# Patient Record
Sex: FEMALE | Race: WHITE | NOT HISPANIC OR LATINO | Employment: OTHER | URBAN - METROPOLITAN AREA
[De-identification: names, ages, dates, MRNs, and addresses within clinical notes are randomized per-mention and may not be internally consistent; named-entity substitution may affect disease eponyms.]

---

## 2018-10-26 PROBLEM — C54.1 ENDOMETRIAL CANCER (HCC): Status: ACTIVE | Noted: 2018-10-26

## 2018-10-26 RX ORDER — ONDANSETRON HYDROCHLORIDE 8 MG/1
TABLET, FILM COATED ORAL EVERY 8 HOURS PRN
COMMUNITY

## 2018-10-26 RX ORDER — PROCHLORPERAZINE MALEATE 10 MG
10 TABLET ORAL EVERY 6 HOURS PRN
COMMUNITY

## 2018-10-26 RX ORDER — CHOLECALCIFEROL (VITAMIN D3) 125 MCG
3000 CAPSULE ORAL
COMMUNITY

## 2018-10-26 RX ORDER — DEXAMETHASONE 4 MG/1
4 TABLET ORAL 2 TIMES DAILY WITH MEALS
COMMUNITY
End: 2019-01-09 | Stop reason: SDUPTHER

## 2018-10-26 RX ORDER — DIPHENOXYLATE HYDROCHLORIDE AND ATROPINE SULFATE 2.5; .025 MG/1; MG/1
1 TABLET ORAL 4 TIMES DAILY PRN
COMMUNITY
End: 2018-11-07 | Stop reason: SDUPTHER

## 2018-10-26 RX ORDER — IBUPROFEN 600 MG/1
TABLET ORAL EVERY 6 HOURS PRN
COMMUNITY

## 2018-10-26 RX ORDER — EZETIMIBE 10 MG/1
10 TABLET ORAL DAILY
COMMUNITY

## 2018-10-26 RX ORDER — LORAZEPAM 1 MG/1
0.5 TABLET ORAL EVERY 8 HOURS PRN
COMMUNITY
End: 2018-12-04 | Stop reason: SDUPTHER

## 2018-11-07 ENCOUNTER — OFFICE VISIT (OUTPATIENT)
Dept: GYNECOLOGIC ONCOLOGY | Facility: CLINIC | Age: 83
End: 2018-11-07
Payer: MEDICARE

## 2018-11-07 VITALS
SYSTOLIC BLOOD PRESSURE: 110 MMHG | BODY MASS INDEX: 15.21 KG/M2 | WEIGHT: 77.5 LBS | HEART RATE: 88 BPM | TEMPERATURE: 98.6 F | HEIGHT: 60 IN | DIASTOLIC BLOOD PRESSURE: 70 MMHG | RESPIRATION RATE: 16 BRPM

## 2018-11-07 DIAGNOSIS — R19.7 DIARRHEA, UNSPECIFIED TYPE: Primary | ICD-10-CM

## 2018-11-07 DIAGNOSIS — K52.9 CHRONIC DIARRHEA: ICD-10-CM

## 2018-11-07 DIAGNOSIS — C54.1 ENDOMETRIAL CANCER (HCC): Primary | ICD-10-CM

## 2018-11-07 DIAGNOSIS — R53.82 CHRONIC FATIGUE: ICD-10-CM

## 2018-11-07 PROCEDURE — 99215 OFFICE O/P EST HI 40 MIN: CPT | Performed by: OBSTETRICS & GYNECOLOGY

## 2018-11-07 RX ORDER — DIPHENOXYLATE HYDROCHLORIDE AND ATROPINE SULFATE 2.5; .025 MG/1; MG/1
1 TABLET ORAL 4 TIMES DAILY PRN
Qty: 30 TABLET | Refills: 3 | Status: SHIPPED | OUTPATIENT
Start: 2018-11-07

## 2018-11-07 NOTE — ASSESSMENT & PLAN NOTE
The patient has a long history of chronic fatigue  It is likely significantly due to age and declining over time  It is definitely worse when her disease is active and her treatment is ongoing  A CBC will be drawn to assure that she is not significantly anemic as a heart murmur was noted on exam today  Additionally a thyroid function will be drawn to assure she is not hypothyroid

## 2018-11-07 NOTE — PROGRESS NOTES
Assessment/Plan:    Problem List Items Addressed This Visit     RESOLVED: Endometrial cancer ([facility] [address] ) - Primary     Patient has active recurrence of her endometrial cancer as evidence by PET-CT scan and active bleeding and cramping  The patient has had this ongoing for approximately 15 years  We have discussed treatment options  In the past the patient has been very resistant not treat as it affects her quality of life  We have recommended restarting Taxol at 135 milligrams/meter squared every 3 weeks  We have recommended no Neulasta support  We discussed with the patient risks and benefits of the treatment including transfusion as result of low blood counts infection as a result of low blood counts kidney and liver damage as well as infection  The patient is aware of this as she has been taking the drug off and on for many years now  She has signed an informed consent and would like to get started with her chemo as soon as possible rather than waiting till after the holidays  She understands that this is palliative treatment and the symptoms will likely regress for some time and then recur  Pretreatment labs will be ordered prior to the initiation of treatment  Chronic diarrhea     The patient has a history of chronic diarrhea  This has been ongoing for many years  It is likely the result of treatment including chemo and radiation therapy  She has used Imodium in the past with minimal success  We will write a prescription for Lomotil to be used on a p r n  basis  Chronic fatigue     The patient has a long history of chronic fatigue  It is likely significantly due to age and declining over time  It is definitely worse when her disease is active and her treatment is ongoing  A CBC will be drawn to assure that she is not significantly anemic as a heart murmur was noted on exam today  Additionally a thyroid function will be drawn to assure she is not hypothyroid  CHIEF COMPLAINT:   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 [address] ) (Resolved)    4/2004 Initial Diagnosis     Endometrial cancer ([facility] [address] ) grade 2 versus pap serous         4/22/2004 Surgery     LINO BSO P/PALND for stage IA gr 1 endometrial cancer         5/2004 - 6/2004 Radiation     Vaginal brachytherapy X3         2007 Progression     Upper vagina         2007 -  Radiation     WP XRT plus 2 further vag brachytherapy         4/2009 Progression     Pet positive upper vagina cuff         4/2009 -  Chemotherapy     Started megace but unable to tolerate         6/2009 Surgery     Ex Lap radical parametrectomy partial vaginectomy for recurrent endometrial adenocarcinoma         4/2010 Progression     Biopsy proven upper vaginal recurrence         5/2010 - 11/2010 Chemotherapy     Carbo/taxol X6 No further pain or bleeding         10/2011 Progression     Vaginal bleeding         10/2011 - 3/2012 Chemotherapy     Carbo/taxotere  Couldn't tolerate  Completed with carb/toxol for a total of 6 cycles         11/2012 - 4/2013 Chemotherapy     Carbo/taxol X 6         10/2013 - 3/2014 Chemotherapy     Carbo/taxol X 6         7/2014 - 11/2014 Chemotherapy     Carbo/taxol X6         4/2015 - 8/2015 Chemotherapy     Avastin X 6 with progrssion         6/2015 - 10/2015 Chemotherapy     Taxol X 6         8/2016 - 12/2016 Chemotherapy     Taxol X 6         1/2017 - 8/2017 Chemotherapy     Taxol X 6         1/2018 - 6/2018 Chemotherapy     Taxol X 6              Patient ID: Idalia Mt is a 80 y o  female  Patient is very pleasant 44-year-old white female that I have treated for approximately 15 years  She has a history of 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  She has had too numerous to count recurrences which tend to be at the upper vaginal cuff causing bleeding and pain  She has undergone multiple treatment measures to help to control this including pelvic radiation therapy, vaginal brachytherapy, surgical resection of primary endometrial cancer, surgical resection of the upper vagina for recurrence, and multiple cycles of chemo which she has some difficulty tolerating  Over the past several years we settled on a regimen of Taxol alone without Neulasta or any other support she tolerates this well and this decreases her vaginal bleeding and pelvic pain  Recently the patient be and I have ongoing vaginal bleeding again  She underwent a PET-CT scan in October of 2018  This revealed a FDG avid site in the mid anterior pelvis consistent with a recurrence of her upper vaginal recurrent endometrial cancer  Additionally there were small nodules in the lungs which were borderline FDG avid  It is unclear whether her disease has metastasized  Over the past several months the patient has experience worsening vaginal bleeding  She goes through 3-4 brief so per day  She has associated cramping with this which has not required pain medications  The patient is relatively intolerant of any pain medication and therefore does not take it  She also has had some significant fatigue and spends much time in bed  She is interested in restarting chemotherapy as her symptoms of cramping and bleeding are intolerable  Review of Systems   Constitutional: Positive for fatigue  HENT: Negative  Eyes: Negative  Respiratory: Negative  Cardiovascular: Negative  Gastrointestinal: Positive for abdominal pain and diarrhea  Endocrine: Negative      Genitourinary: Positive for pelvic pain and vaginal bleeding  Musculoskeletal: Negative  Skin: Negative  Neurological: Negative  Hematological: Negative  Psychiatric/Behavioral: Negative  Current Outpatient Prescriptions   Medication Sig Dispense Refill    ASPIRIN 81 PO Take by mouth      CA & PHOS-VIT D 703- MG-MG-UNIT TABS Take by mouth      Cholecalciferol (VITAMIN D3) 88259 units TABS Take by mouth      cyanocobalamin 1000 MCG tablet Take 100 mcg by mouth daily      dexamethasone (DECADRON) 4 mg tablet Take 4 mg by mouth 2 (two) times a day with meals      Dextrose, Diabetic Use, (GLUTOSE 15 PO) Take by mouth      diphenoxylate-atropine (LOMOTIL) 2 5-0 025 mg per tablet Take 1 tablet by mouth 4 (four) times a day as needed for diarrhea      ezetimibe (ZETIA) 10 mg tablet Take 10 mg by mouth daily      ibuprofen (MOTRIN) 600 mg tablet Take by mouth every 6 (six) hours as needed for mild pain      lactase (LACTAID) 3,000 units tablet Take 3,000 Units by mouth 3 (three) times a day with meals      LORazepam (ATIVAN) 1 mg tablet Take 0 5 mg by mouth every 8 (eight) hours as needed for anxiety      ondansetron (ZOFRAN) 8 mg tablet Take by mouth every 8 (eight) hours as needed for nausea or vomiting      prochlorperazine (COMPAZINE) 10 mg tablet Take 10 mg by mouth every 6 (six) hours as needed for nausea or vomiting       No current facility-administered medications for this visit          Allergies   Allergen Reactions    Anastrozole Other (See Comments)     Body aches      Atorvastatin Other (See Comments)     Generalized MSK pain       Azithromycin Nausea Only, GI Intolerance and Vomiting    Carboplatin Itching    Ciprofloxacin Other (See Comments)     Unknown     Hydrocodone      zahraa hydromorphone 6/09    Iodine      IV Dye- hives      Ioxaglate Hives    Latex Hives    Parabens Hives    Penicillins Hives    Rofecoxib      Sick all over      Sulfa Antibiotics      zahraa lasix         Diphenhydramine Anxiety    Sulfacetamide Sodium-Sulfur Rash       Past Medical History:   Diagnosis Date    Abdominal pain     Avascular necrosis of bone of hip, right (HCC)     Cerebrovascular disease     Acute III-defined     Cholecystitis     Chronic kidney disease     Right renal atrophy     Endometrial cancer (HCC)     Hereditary and idiopathic peripheral neuropathy     History of radiation therapy     Vaginal brachytherapy     Hyperlipidemia     Hypertension     Hypoglycemia     Malignant neoplasm of endometrium (HCC)     Radiation colitis     Shingles     Small bowel obstruction (HCC)     Syncope     per pt blacked out due to low blood sugar     TIA (transient ischemic attack)        Past Surgical History:   Procedure Laterality Date    EXPLORATORY LAPAROTOMY W/ BOWEL RESECTION      extensive DAVON, segmental bowel resection     HYSTERECTOMY      W/ B/L salpingoophorectomy     KNEE SURGERY      LYMPH NODE DISSECTION      PARTIAL HIP ARTHROPLASTY      PORTACATH PLACEMENT      TOTAL ABDOMINAL HYSTERECTOMY W/ BILATERAL SALPINGOOPHORECTOMY      PPLND grade 1 endometrial cancer stage 1A    VENTRAL HERNIA REPAIR         OB History      Para Term  AB Living    1 1 1 0 0 1    SAB TAB Ectopic Multiple Live Births    0 0 0 0 1          Family History   Problem Relation Age of Onset    Colon cancer Mother     Lung cancer Sister     Prostate cancer Brother     Endometrial cancer Cousin        The following portions of the patient's history were reviewed and updated as appropriate: allergies, current medications, past family history, past medical history, past social history, past surgical history and problem list       Objective:    Blood pressure 110/70, pulse 88, temperature 98 6 °F (37 °C), resp  rate 16, height 5' (1 524 m), weight 35 2 kg (77 lb 8 oz)  Body mass index is 15 14 kg/m²  Physical Exam   Constitutional: She is oriented to person, place, and time     Cachectic HENT:   Head: Normocephalic and atraumatic  Eyes: EOM are normal    Temporal wasting   Neck: Normal range of motion  Neck supple  No thyromegaly present  Cardiovascular: Normal rate and regular rhythm  Murmur heard  Blowing holosystolic ejection murmur noted   Pulmonary/Chest: Effort normal and breath sounds normal    Abdominal: Soft  Bowel sounds are normal    Well healed laparoscopic incisions  Genitourinary:   Genitourinary Comments: -deferred per patient request   Musculoskeletal: Normal range of motion  Lymphadenopathy:     She has no cervical adenopathy  Neurological: She is alert and oriented to person, place, and time  Skin: Skin is warm and dry  Psychiatric: She has a normal mood and affect   Her behavior is normal

## 2018-11-07 NOTE — ASSESSMENT & PLAN NOTE
Patient has active recurrence of her endometrial cancer as evidence by PET-CT scan and active bleeding and cramping  The patient has had this ongoing for approximately 15 years  We have discussed treatment options  In the past the patient has been very resistant not treat as it affects her quality of life  We have recommended restarting Taxol at 135 milligrams/meter squared every 3 weeks  We have recommended no Neulasta support  We discussed with the patient risks and benefits of the treatment including transfusion as result of low blood counts infection as a result of low blood counts kidney and liver damage as well as infection  The patient is aware of this as she has been taking the drug off and on for many years now  She has signed an informed consent and would like to get started with her chemo as soon as possible rather than waiting till after the holidays  She understands that this is palliative treatment and the symptoms will likely regress for some time and then recur  Pretreatment labs will be ordered prior to the initiation of treatment

## 2018-11-07 NOTE — ASSESSMENT & PLAN NOTE
The patient has a history of chronic diarrhea  This has been ongoing for many years  It is likely the result of treatment including chemo and radiation therapy  She has used Imodium in the past with minimal success  We will write a prescription for Lomotil to be used on a p r n  basis

## 2018-11-28 RX ORDER — SODIUM CHLORIDE 9 MG/ML
20 INJECTION, SOLUTION INTRAVENOUS CONTINUOUS
Status: DISCONTINUED | OUTPATIENT
Start: 2018-11-29 | End: 2018-12-02 | Stop reason: HOSPADM

## 2018-11-29 ENCOUNTER — HOSPITAL ENCOUNTER (OUTPATIENT)
Dept: INFUSION CENTER | Facility: CLINIC | Age: 83
Discharge: HOME | End: 2018-11-29
Payer: MEDICARE

## 2018-11-29 VITALS
RESPIRATION RATE: 18 BRPM | BODY MASS INDEX: 16.84 KG/M2 | SYSTOLIC BLOOD PRESSURE: 104 MMHG | DIASTOLIC BLOOD PRESSURE: 57 MMHG | HEIGHT: 58 IN | TEMPERATURE: 98.1 F | HEART RATE: 87 BPM | WEIGHT: 80.25 LBS

## 2018-11-29 LAB — TSH SERPL DL<=0.05 MIU/L-ACNC: 1.66 UIU/ML (ref 0.36–3.74)

## 2018-11-29 PROCEDURE — 96415 CHEMO IV INFUSION ADDL HR: CPT

## 2018-11-29 PROCEDURE — 84443 ASSAY THYROID STIM HORMONE: CPT | Performed by: OBSTETRICS & GYNECOLOGY

## 2018-11-29 PROCEDURE — 96367 TX/PROPH/DG ADDL SEQ IV INF: CPT

## 2018-11-29 PROCEDURE — 96413 CHEMO IV INFUSION 1 HR: CPT

## 2018-11-29 RX ADMIN — DEXAMETHASONE SODIUM PHOSPHATE 20 MG: 10 INJECTION, SOLUTION INTRAMUSCULAR; INTRAVENOUS at 09:42

## 2018-11-29 RX ADMIN — FAMOTIDINE 20 MG: 10 INJECTION, SOLUTION INTRAVENOUS at 10:48

## 2018-11-29 RX ADMIN — DIPHENHYDRAMINE HYDROCHLORIDE 25 MG: 50 INJECTION, SOLUTION INTRAMUSCULAR; INTRAVENOUS at 10:16

## 2018-11-29 RX ADMIN — HEPARIN 300 UNITS: 100 SYRINGE at 14:25

## 2018-11-29 RX ADMIN — PACLITAXEL 167 MG: 6 INJECTION, SOLUTION INTRAVENOUS at 11:25

## 2018-11-29 RX ADMIN — SODIUM CHLORIDE 20 ML/HR: 0.9 INJECTION, SOLUTION INTRAVENOUS at 09:35

## 2018-11-29 NOTE — PLAN OF CARE
Problem: Potential for Falls  Goal: Patient will remain free of falls  INTERVENTIONS:  - Assess patient frequently for physical needs  -  Identify cognitive and physical deficits and behaviors that affect risk of falls    -  [place] fall precautions as indicated by assessment   - Educate patient/family on patient safety including physical limitations  - Instruct patient to call for assistance with activity based on assessment  - Modify environment to reduce risk of injury  - Consider OT/PT consult to assist with strengthening/mobility   Outcome: Progressing

## 2018-12-04 DIAGNOSIS — C54.1 ENDOMETRIAL CANCER (HCC): Primary | ICD-10-CM

## 2018-12-04 RX ORDER — LORAZEPAM 1 MG/1
1 TABLET ORAL EVERY 6 HOURS PRN
Qty: 30 TABLET | Refills: 1 | Status: SHIPPED | OUTPATIENT
Start: 2018-12-04

## 2018-12-19 ENCOUNTER — OFFICE VISIT (OUTPATIENT)
Dept: GYNECOLOGIC ONCOLOGY | Facility: CLINIC | Age: 83
End: 2018-12-19
Payer: MEDICARE

## 2018-12-19 ENCOUNTER — TELEPHONE (OUTPATIENT)
Dept: PALLIATIVE MEDICINE | Facility: CLINIC | Age: 83
End: 2018-12-19

## 2018-12-19 VITALS
RESPIRATION RATE: 16 BRPM | HEART RATE: 68 BPM | BODY MASS INDEX: 18.16 KG/M2 | DIASTOLIC BLOOD PRESSURE: 72 MMHG | WEIGHT: 86.5 LBS | TEMPERATURE: 96.3 F | SYSTOLIC BLOOD PRESSURE: 108 MMHG | HEIGHT: 58 IN

## 2018-12-19 DIAGNOSIS — C54.1 ENDOMETRIAL CANCER (HCC): Primary | ICD-10-CM

## 2018-12-19 PROCEDURE — 99214 OFFICE O/P EST MOD 30 MIN: CPT | Performed by: OBSTETRICS & GYNECOLOGY

## 2018-12-19 RX ORDER — SODIUM CHLORIDE 9 MG/ML
20 INJECTION, SOLUTION INTRAVENOUS CONTINUOUS
Status: DISCONTINUED | OUTPATIENT
Start: 2018-12-20 | End: 2018-12-23 | Stop reason: HOSPADM

## 2018-12-19 NOTE — ASSESSMENT & PLAN NOTE
Patient presents today in consideration for cycle number 2 of Taxol alone  She did have some vocal changes in which made it difficult to speak  This lasted 1 week  She was able to write during this time to communicate  She did not have any other symptoms of upper respiratory tract infections or other neurologic symptoms  Otherwise she has tolerated her initial chemotherapy without difficulty  She still appears frail but wishes to proceed with chemotherapy as it helps to improve her symptoms of pelvic pain and vaginal bleeding  The patient does have some needs for help at home and appears frail and forgetful  I would place an palliative care consult the for home evaluation to see how the patient is doing in her own setting and to help to control some of her symptoms

## 2018-12-19 NOTE — PROGRESS NOTES
Assessment/Plan:    Problem List Items Addressed This Visit     RESOLVED: Endometrial cancer ([facility] [address] ) - Primary     Patient presents today in consideration for cycle number 2 of Taxol alone  She did have some vocal changes in which made it difficult to speak  This lasted 1 week  She was able to write during this time to communicate  She did not have any other symptoms of upper respiratory tract infections or other neurologic symptoms  Otherwise she has tolerated her initial chemotherapy without difficulty  She still appears frail but wishes to proceed with chemotherapy as it helps to improve her symptoms of pelvic pain and vaginal bleeding  The patient does have some needs for help at home and appears frail and forgetful  I would place an palliative care consult the for home evaluation to see how the patient is doing in her own setting and to help to control some of her symptoms  Relevant Orders    Ambulatory referral to Palliative Care            CHIEF COMPLAINT:   Chemotherapy with Taxol cycle 2 for recurrent papillary serous endometrial cancer      Problem:  Cancer Staging  No matching staging information was found for the patient        Previous therapy:     Endometrial cancer ([facility] [address] ) (Resolved)    4/2004 Initial Diagnosis     [facility] grade 2 versus pap serous         4/22/2004 Surgery     LINO BSO P/PALND for stage IA gr 1 endometrial cancer         5/2004 - 6/2004 Radiation     Vaginal brachytherapy X3         2007 Progression     Upper vagina         2007 -  Radiation     WP XRT plus 2 further vag brachytherapy         4/2009 Progression     Pet positive upper vagina cuff         4/2009 -  Chemotherapy     Started megace but unable to tolerate         6/2009 Surgery     Ex Lap radical parametrectomy partial vaginectomy for recurrent endometrial adenocarcinoma         4/2010 Progression     Biopsy proven upper vaginal recurrence         5/2010 - 11/2010 Chemotherapy     Carbo/taxol X6 No further pain or bleeding         10/2011 Progression     Vaginal bleeding         10/2011 - 3/2012 Chemotherapy     Carbo/taxotere  Couldn't tolerate  Completed with carb/toxol for a total of 6 cycles         11/2012 - 4/2013 Chemotherapy     Carbo/taxol X 6         10/2013 - 3/2014 Chemotherapy     Carbo/taxol X 6         7/2014 - 11/2014 Chemotherapy     Carbo/taxol X6         4/2015 - 8/2015 Chemotherapy     Avastin X 6 with progrssion         6/2015 - 10/2015 Chemotherapy     Taxol X 6         8/2016 - 12/2016 Chemotherapy     Taxol X 6         1/2017 - 8/2017 Chemotherapy     Taxol X 6         1/2018 - 6/2018 Chemotherapy     Taxol X 6              Patient ID: Barber Burgess is a 80 y o  female  Patient presents today for consideration of cycle 2  Of Taxol alone for recurrent papillary serous endometrial cancer  The patient presents today she notes that she has had mild symptoms after her last chemo  She tolerated the chemo fine however her voice became for course for week post chemo  She communicated by writing during that time  This has subsequently resolved without difficulty  She has had no other symptoms  She does wish to continue her chemo  She is becoming concerned about her frailty and need for help at home  The following portions of the patient's history were reviewed and updated as appropriate: allergies, current medications, past family history, past medical history, past social history, past surgical history and problem list     Review of Systems   Constitutional: Negative  HENT: Negative  Eyes: Negative  Respiratory: Negative  Cardiovascular: Negative  Gastrointestinal: Negative  Endocrine: Negative  Genitourinary: Negative  Musculoskeletal: Negative  Skin: Negative  Neurological: Negative  Hematological: Negative  Psychiatric/Behavioral: Negative          Current Outpatient Prescriptions   Medication Sig Dispense Refill    ASPIRIN 81 PO Take by mouth      CA & PHOS-VIT D 777- MG-MG-UNIT TABS Take by mouth      Cholecalciferol (VITAMIN D3) 10050 units TABS Take by mouth      cyanocobalamin 1000 MCG tablet Take 100 mcg by mouth daily      dexamethasone (DECADRON) 4 mg tablet Take 4 mg by mouth 2 (two) times a day with meals      Dextrose, Diabetic Use, (GLUTOSE 15 PO) Take by mouth      diphenoxylate-atropine (LOMOTIL) 2 5-0 025 mg per tablet Take 1 tablet by mouth 4 (four) times a day as needed for diarrhea 30 tablet 3    ezetimibe (ZETIA) 10 mg tablet Take 10 mg by mouth daily      ibuprofen (MOTRIN) 600 mg tablet Take by mouth every 6 (six) hours as needed for mild pain      lactase (LACTAID) 3,000 units tablet Take 3,000 Units by mouth 3 (three) times a day with meals      LORazepam (ATIVAN) 1 mg tablet Take 1 tablet (1 mg total) by mouth every 6 (six) hours as needed (nausea or anxiety) 30 tablet 1    ondansetron (ZOFRAN) 8 mg tablet Take by mouth every 8 (eight) hours as needed for nausea or vomiting      prochlorperazine (COMPAZINE) 10 mg tablet Take 10 mg by mouth every 6 (six) hours as needed for nausea or vomiting       No current facility-administered medications for this visit  Objective:    Blood pressure 108/72, pulse 68, temperature (!) 96 3 °F (35 7 °C), resp  rate 16, height 4' 10 03" (1 474 m), weight 39 2 kg (86 lb 8 oz)  Body mass index is 18 06 kg/m²  Body surface area is 1 28 meters squared  Physical Exam   Constitutional: She is oriented to person, place, and time  She appears well-developed and well-nourished  HENT:   Head: Normocephalic and atraumatic  Eyes: EOM are normal    Neck: Normal range of motion  Neck supple  No thyromegaly present  Cardiovascular: Normal rate, regular rhythm and normal heart sounds  Pulmonary/Chest: Effort normal and breath sounds normal    Abdominal: Soft  Bowel sounds are normal    Well healed laparoscopic incisions     Genitourinary:   Genitourinary Comments: Deferred per patient request     Musculoskeletal: Normal range of motion  Lymphadenopathy:     She has no cervical adenopathy  Neurological: She is alert and oriented to person, place, and time  Skin: Skin is warm and dry  Psychiatric: She has a normal mood and affect   Her behavior is normal        Outside blood work was reviewed for December 10th and is acceptable with a white blood cell count of 3 1

## 2018-12-20 ENCOUNTER — HOSPITAL ENCOUNTER (OUTPATIENT)
Dept: INFUSION CENTER | Facility: CLINIC | Age: 83
Discharge: HOME | End: 2018-12-20
Payer: MEDICARE

## 2018-12-20 ENCOUNTER — TELEPHONE (OUTPATIENT)
Dept: PALLIATIVE MEDICINE | Facility: CLINIC | Age: 83
End: 2018-12-20

## 2018-12-20 VITALS
BODY MASS INDEX: 16.84 KG/M2 | TEMPERATURE: 97.7 F | DIASTOLIC BLOOD PRESSURE: 50 MMHG | SYSTOLIC BLOOD PRESSURE: 92 MMHG | HEIGHT: 58 IN | HEART RATE: 74 BPM | RESPIRATION RATE: 16 BRPM | WEIGHT: 80.25 LBS

## 2018-12-20 DIAGNOSIS — R60.0 EDEMA OF LEFT LOWER EXTREMITY: Primary | ICD-10-CM

## 2018-12-20 LAB
ALBUMIN SERPL BCP-MCNC: 2.4 G/DL (ref 3.5–5)
ALP SERPL-CCNC: 81 U/L (ref 46–116)
ALT SERPL W P-5'-P-CCNC: 13 U/L (ref 12–78)
ANION GAP SERPL CALCULATED.3IONS-SCNC: 9 MMOL/L (ref 4–13)
AST SERPL W P-5'-P-CCNC: 14 U/L (ref 5–45)
BASOPHILS # BLD AUTO: 0.01 THOUSANDS/ΜL (ref 0–0.1)
BASOPHILS NFR BLD AUTO: 0 % (ref 0–1)
BILIRUB SERPL-MCNC: 0.2 MG/DL (ref 0.2–1)
BUN SERPL-MCNC: 18 MG/DL (ref 5–25)
CALCIUM SERPL-MCNC: 8.2 MG/DL (ref 8.3–10.1)
CHLORIDE SERPL-SCNC: 106 MMOL/L (ref 100–108)
CO2 SERPL-SCNC: 23 MMOL/L (ref 21–32)
CREAT SERPL-MCNC: 1.11 MG/DL (ref 0.6–1.3)
EOSINOPHIL # BLD AUTO: 0.02 THOUSAND/ΜL (ref 0–0.61)
EOSINOPHIL NFR BLD AUTO: 0 % (ref 0–6)
ERYTHROCYTE [DISTWIDTH] IN BLOOD BY AUTOMATED COUNT: 13.2 % (ref 11.6–15.1)
GFR SERPL CREATININE-BSD FRML MDRD: 45 ML/MIN/1.73SQ M
GLUCOSE SERPL-MCNC: 90 MG/DL (ref 65–140)
HCT VFR BLD AUTO: 27.5 % (ref 34.8–46.1)
HGB BLD-MCNC: 8.9 G/DL (ref 11.5–15.4)
IMM GRANULOCYTES # BLD AUTO: 0.03 THOUSAND/UL (ref 0–0.2)
IMM GRANULOCYTES NFR BLD AUTO: 1 % (ref 0–2)
LYMPHOCYTES # BLD AUTO: 0.79 THOUSANDS/ΜL (ref 0.6–4.47)
LYMPHOCYTES NFR BLD AUTO: 15 % (ref 14–44)
MAGNESIUM SERPL-MCNC: 1 MG/DL (ref 1.6–2.6)
MCH RBC QN AUTO: 30.1 PG (ref 26.8–34.3)
MCHC RBC AUTO-ENTMCNC: 32.4 G/DL (ref 31.4–37.4)
MCV RBC AUTO: 93 FL (ref 82–98)
MONOCYTES # BLD AUTO: 0.15 THOUSAND/ΜL (ref 0.17–1.22)
MONOCYTES NFR BLD AUTO: 3 % (ref 4–12)
NEUTROPHILS # BLD AUTO: 4.36 THOUSANDS/ΜL (ref 1.85–7.62)
NEUTS SEG NFR BLD AUTO: 81 % (ref 43–75)
NRBC BLD AUTO-RTO: 0 /100 WBCS
PLATELET # BLD AUTO: 243 THOUSANDS/UL (ref 149–390)
PMV BLD AUTO: 9.2 FL (ref 8.9–12.7)
POTASSIUM SERPL-SCNC: 4.3 MMOL/L (ref 3.5–5.3)
PROT SERPL-MCNC: 6.2 G/DL (ref 6.4–8.2)
RBC # BLD AUTO: 2.96 MILLION/UL (ref 3.81–5.12)
SODIUM SERPL-SCNC: 138 MMOL/L (ref 136–145)
WBC # BLD AUTO: 5.36 THOUSAND/UL (ref 4.31–10.16)

## 2018-12-20 PROCEDURE — 96368 THER/DIAG CONCURRENT INF: CPT

## 2018-12-20 PROCEDURE — 83735 ASSAY OF MAGNESIUM: CPT | Performed by: PHYSICIAN ASSISTANT

## 2018-12-20 PROCEDURE — 96415 CHEMO IV INFUSION ADDL HR: CPT

## 2018-12-20 PROCEDURE — 96413 CHEMO IV INFUSION 1 HR: CPT

## 2018-12-20 PROCEDURE — 80053 COMPREHEN METABOLIC PANEL: CPT | Performed by: PHYSICIAN ASSISTANT

## 2018-12-20 PROCEDURE — 96367 TX/PROPH/DG ADDL SEQ IV INF: CPT

## 2018-12-20 PROCEDURE — 85025 COMPLETE CBC W/AUTO DIFF WBC: CPT | Performed by: PHYSICIAN ASSISTANT

## 2018-12-20 RX ORDER — MAGNESIUM SULFATE HEPTAHYDRATE 40 MG/ML
2 INJECTION, SOLUTION INTRAVENOUS ONCE
Status: COMPLETED | OUTPATIENT
Start: 2018-12-20 | End: 2018-12-20

## 2018-12-20 RX ADMIN — MAGNESIUM SULFATE HEPTAHYDRATE 2 G: 40 INJECTION, SOLUTION INTRAVENOUS at 14:01

## 2018-12-20 RX ADMIN — PACLITAXEL 173 MG: 6 INJECTION, SOLUTION INTRAVENOUS at 12:37

## 2018-12-20 RX ADMIN — FAMOTIDINE 20 MG: 10 INJECTION, SOLUTION INTRAVENOUS at 12:05

## 2018-12-20 RX ADMIN — DIPHENHYDRAMINE HYDROCHLORIDE 25 MG: 50 INJECTION, SOLUTION INTRAMUSCULAR; INTRAVENOUS at 11:42

## 2018-12-20 RX ADMIN — HEPARIN 300 UNITS: 100 SYRINGE at 15:40

## 2018-12-20 RX ADMIN — SODIUM CHLORIDE 20 ML/HR: 0.9 INJECTION, SOLUTION INTRAVENOUS at 10:30

## 2018-12-20 RX ADMIN — DEXAMETHASONE SODIUM PHOSPHATE 20 MG: 10 INJECTION, SOLUTION INTRAMUSCULAR; INTRAVENOUS at 11:21

## 2018-12-20 RX ADMIN — MAGNESIUM SULFATE HEPTAHYDRATE 2 G: 40 INJECTION, SOLUTION INTRAVENOUS at 12:06

## 2018-12-20 NOTE — TELEPHONE ENCOUNTER
Received call from Alma at Dr Sahil Brown office  GYN/ONC yesterday 12 19 18 in am  Initial request was for Palliative services and home evaluation  Per OV note pt is frail forgetful   Continuing with chemo to treat symptoms and bleeding  Dr Sahil Brown requesting home visit to assess her situation and needs  This office reviewed pt information and noted pt lives in [facility] which lies outside of our home visit range  Return call made to office with instructions to try [facility]  to see if she could be opened to their services  300pm call from Dr Sahil Bronw office  [facility] also does not have nurses in that area  Again requesting evaluation by Palliative Care and would like pt to be seen in our office  12 20 18   Colleen Martins Call placed to pt home number today  LM on pt voice mail to please call our office to arrange an office visit with Dr Juan Carlos Rodriguez in [facility] office

## 2018-12-20 NOTE — PROGRESS NOTES
Spoke with Shankar Callahan pa-c pt ok for chemotherapy today, pt to be set up for doppler study for LLE opt also to be given 4 GMs of mag concurrently with taxol today

## 2018-12-20 NOTE — PROGRESS NOTES
Pt d/katiuska in stable condition confirmed appointment for doppler study tomorrow with patient and   Pt d/katiuska in stable condition  AVS provided asked patient to please bring medications to compare for accuracy  Also reviewed next appointments and chemo calendar provided by GYN oncology for labwork   Pt and  verbalized understanding of these instructions

## 2018-12-20 NOTE — PLAN OF CARE
Problem: Potential for Falls  Goal: Patient will remain free of falls  INTERVENTIONS:  - Assess patient frequently for physical needs  -  Identify cognitive and physical deficits and behaviors that affect risk of falls    -  [place] fall precautions as indicated by assessment   - Educate patient/family on patient safety including physical limitations  - Instruct patient to call for assistance with activity based on assessment  - Modify environment to reduce risk of injury  - Consider OT/PT consult to assist with strengthening/mobility   Outcome: Progressing

## 2018-12-21 ENCOUNTER — DOCUMENTATION (OUTPATIENT)
Dept: INFUSION CENTER | Facility: CLINIC | Age: 83
End: 2018-12-21

## 2018-12-21 ENCOUNTER — HOSPITAL ENCOUNTER (OUTPATIENT)
Dept: ULTRASOUND IMAGING | Facility: HOSPITAL | Age: 83
Discharge: HOME | End: 2018-12-21
Payer: MEDICARE

## 2018-12-21 DIAGNOSIS — R60.0 EDEMA OF LEFT LOWER EXTREMITY: ICD-10-CM

## 2018-12-21 PROCEDURE — 93970 EXTREMITY STUDY: CPT

## 2018-12-21 PROCEDURE — 93970 EXTREMITY STUDY: CPT | Performed by: SURGERY

## 2018-12-21 NOTE — SOCIAL WORK
MSW met with pt and her  yesterday in the infusion center  They are both [place]  Pt reports that she needs help at home with housekeeping  Her  drives her to appointments, although he says he does not drive in the dark  We discussed a referral to the [employer] and pt was agreeable to that  Pt had a doppler study scheduled today at 3:00, however her infusion will be running later than that  Pt's  wants to have the doppler done at a medical office near their home, however the ordering physician would prefer it done here due to the concern of a blood clot  Pt's  required several explanations as to why this was the case  There was some concern about them being able to drive home as well, because pt's  does not drive in the dark  The doppler was rescheduled for 8:15am on 12/21  Pt's  says he is unsure if they will make that appointment bc pt sleeps late  MSW and RN stressed to him the importance of getting the testing done, and that if pt was unable to make the 8:15 apt that she should go to the ER and have the test done there  Pt's  did verbalize his understanding  There is no emergency contact listed for pt other than her   They have a son who lives in [place] and friends in the area who help them out, but they do not have phone numbers for any of these people with them and cannot recall what the numbers would be  Pt's  felt comfortable driving home if they were able to leave by 4, which they were  [employer] referral will be made if pt is not hospitalized following doppler study today  MSW did discuss [employer] and [employer] services with them as well, they declined to utilize either at this point  MSW will continue to f/u with pt and spouse as needed

## 2018-12-26 ENCOUNTER — TELEPHONE (OUTPATIENT)
Dept: PALLIATIVE MEDICINE | Facility: CLINIC | Age: 83
End: 2018-12-26

## 2018-12-26 NOTE — TELEPHONE ENCOUNTER
12 26 18 fu call to Dr Gadiel Jasso office  Noted MSW intervention re [employer] for home needs  Pt never called back this office to make appointment  Per [facility] clinical staff please reach out to pt in 1 week after hoildays  Dr Gadiel Jasso would still like her evaluated by Palliative services

## 2019-01-03 NOTE — TELEPHONE ENCOUNTER
FU call to Dr Lyudmila Serrano office  No return call from pt to schedule office visit in our [facility] office  Per Tanmay Marie in office , please call pt after the holidays to try and schedule as new pt   Pt has been seen by Hem /Onc MSW  Per [facility] pt will be evaluated in home for needs     This nurse will call pt first full week of jan 2019

## 2019-01-09 ENCOUNTER — OFFICE VISIT (OUTPATIENT)
Dept: GYNECOLOGIC ONCOLOGY | Facility: CLINIC | Age: 84
End: 2019-01-09
Payer: MEDICARE

## 2019-01-09 VITALS
TEMPERATURE: 98.1 F | WEIGHT: 85 LBS | HEIGHT: 58 IN | DIASTOLIC BLOOD PRESSURE: 70 MMHG | HEART RATE: 80 BPM | RESPIRATION RATE: 18 BRPM | BODY MASS INDEX: 17.84 KG/M2 | SYSTOLIC BLOOD PRESSURE: 100 MMHG

## 2019-01-09 DIAGNOSIS — C54.1 MALIGNANT NEOPLASM OF ENDOMETRIUM (HCC): ICD-10-CM

## 2019-01-09 DIAGNOSIS — R53.82 CHRONIC FATIGUE: ICD-10-CM

## 2019-01-09 DIAGNOSIS — C54.1 MALIGNANT NEOPLASM OF ENDOMETRIUM (HCC): Primary | ICD-10-CM

## 2019-01-09 DIAGNOSIS — C54.1 ENDOMETRIAL CANCER (HCC): ICD-10-CM

## 2019-01-09 DIAGNOSIS — B37.49 GENITAL CANDIDIASIS: ICD-10-CM

## 2019-01-09 PROBLEM — R10.2 PELVIC PAIN: Status: ACTIVE | Noted: 2019-01-09

## 2019-01-09 PROCEDURE — 99215 OFFICE O/P EST HI 40 MIN: CPT | Performed by: OBSTETRICS & GYNECOLOGY

## 2019-01-09 RX ORDER — NYSTATIN 100000 [USP'U]/G
POWDER TOPICAL 2 TIMES DAILY
Qty: 15 G | Refills: 2 | Status: SHIPPED | OUTPATIENT
Start: 2019-01-09 | End: 2019-01-12

## 2019-01-09 RX ORDER — DEXAMETHASONE 4 MG/1
TABLET ORAL
Qty: 50 TABLET | Refills: 0 | Status: SHIPPED | OUTPATIENT
Start: 2019-01-09 | End: 2019-01-09 | Stop reason: SDUPTHER

## 2019-01-09 RX ORDER — DEXAMETHASONE 4 MG/1
TABLET ORAL
Qty: 10 TABLET | Refills: 0 | Status: SHIPPED | OUTPATIENT
Start: 2019-01-09

## 2019-01-09 NOTE — PATIENT INSTRUCTIONS
Please have prescription for topical nystatin powder to be placed on the vaginal area twice daily for skin irritation  The prescription for the steroid (Decadron) was [facility] to be used 5 tablets the morning before chemo and 5 tablets the evening before chemo    Please have CT scan performed as soon as possible

## 2019-01-09 NOTE — PROGRESS NOTES
Assessment/Plan:    Problem List Items Addressed This Visit     RESOLVED: Endometrial cancer ([facility] [address] )     Patient has tolerated her Taxol without significant difficulty  She is feeling somewhat fatigued  He she notes worsening pelvic pain that is making it difficult for her to sit and lay down  This is a new finding  She continues to note some vaginal bleeding  This worries me for possible progression of disease however exam is unreliable  We have ordered a CT scan to rule out progression of disease  If this is normal we will continue with Taxol as planned  Chronic fatigue     The patient continues to have worsening fatigue related to her chemotherapy  She is able to perform functions of daily living  We are in the process of having elderly agency come in for the home for evaluations as both she and her  are experiencing fatigue and memory issues  Other Visit Diagnoses     Malignant neoplasm of endometrium ([facility] [address] )    -  Primary    Relevant Medications    dexamethasone (DECADRON) 4 mg tablet    Other Relevant Orders    CT abdomen pelvis w wo contrast    Genital candidiasis        Relevant Medications    nystatin (MYCOSTATIN) powder            CHIEF COMPLAINT:   Assessment for chemotherapy cycle 3  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      Problem:  Cancer Staging  No matching staging information was found for the patient        Previous therapy:     Endometrial cancer ([facility] [address] ) (Resolved)    4/2004 Initial Diagnosis     [facility] grade 2 versus pap serous         4/22/2004 Surgery     LINO BSO P/PALND for stage IA gr 1 endometrial cancer         5/2004 - 6/2004 Radiation     Vaginal brachytherapy X3         2007 Progression     Upper vagina         2007 -  Radiation     WP XRT plus 2 further vag brachytherapy         4/2009 Progression     Pet positive upper vagina cuff         4/2009 -  Chemotherapy     Started megace but unable to tolerate 6/2009 Surgery     Ex Lap radical parametrectomy partial vaginectomy for recurrent endometrial adenocarcinoma         4/2010 Progression     Biopsy proven upper vaginal recurrence         5/2010 - 11/2010 Chemotherapy     Carbo/taxol X6 No further pain or bleeding         10/2011 Progression     Vaginal bleeding         10/2011 - 3/2012 Chemotherapy     Carbo/taxotere  Couldn't tolerate  Completed with carb/toxol for a total of 6 cycles         11/2012 - 4/2013 Chemotherapy     Carbo/taxol X 6         10/2013 - 3/2014 Chemotherapy     Carbo/taxol X 6         7/2014 - 11/2014 Chemotherapy     Carbo/taxol X6         4/2015 - 8/2015 Chemotherapy     Avastin X 6 with progrssion         6/2015 - 10/2015 Chemotherapy     Taxol X 6         8/2016 - 12/2016 Chemotherapy     Taxol X 6         1/2017 - 8/2017 Chemotherapy     Taxol X 6         1/2018 - 6/2018 Chemotherapy     Taxol X 6              Patient ID: Zenaida Giordano is a 80 y o  female  Patient presents for consideration of cycle 3  Of Taxol alone for recurrent pelvic papillary serous endometrial cancer  She appears to be tolerating her chemotherapy well with the exception of fatigue which is a chronic problem for her  In the interim she has developed worsening pelvic pain  She is taking 1/2 of an Advil for this and has difficulty with both sitting and lying down  She continues to have some degree of vaginal dark discharge  She is having no problems with bowel or bladder function  The following portions of the patient's history were reviewed and updated as appropriate: allergies, current medications, past family history, past medical history, past social history, past surgical history and problem list     Review of Systems   Constitutional: Positive for fatigue  HENT: Negative  Eyes: Negative  Respiratory: Negative  Cardiovascular: Negative  Gastrointestinal: Positive for abdominal pain  Endocrine: Negative      Genitourinary: Positive for pelvic pain  Patient continues with intermittent dark vaginal discharge   Musculoskeletal: Negative  Skin: Negative  Neurological: Negative  Hematological: Negative  Psychiatric/Behavioral: Negative  Current Outpatient Prescriptions   Medication Sig Dispense Refill    ASPIRIN 81 PO Take by mouth      CA & PHOS-VIT D 919- MG-MG-UNIT TABS Take by mouth      Cholecalciferol (VITAMIN D3) 36915 units TABS Take by mouth      cyanocobalamin 1000 MCG tablet Take 100 mcg by mouth daily      dexamethasone (DECADRON) 4 mg tablet Take 4 mg by mouth 2 (two) times a day with meals      Dextrose, Diabetic Use, (GLUTOSE 15 PO) Take by mouth      diphenoxylate-atropine (LOMOTIL) 2 5-0 025 mg per tablet Take 1 tablet by mouth 4 (four) times a day as needed for diarrhea 30 tablet 3    ezetimibe (ZETIA) 10 mg tablet Take 10 mg by mouth daily      ibuprofen (MOTRIN) 600 mg tablet Take by mouth every 6 (six) hours as needed for mild pain      lactase (LACTAID) 3,000 units tablet Take 3,000 Units by mouth 3 (three) times a day with meals      LORazepam (ATIVAN) 1 mg tablet Take 1 tablet (1 mg total) by mouth every 6 (six) hours as needed (nausea or anxiety) 30 tablet 1    ondansetron (ZOFRAN) 8 mg tablet Take by mouth every 8 (eight) hours as needed for nausea or vomiting      prochlorperazine (COMPAZINE) 10 mg tablet Take 10 mg by mouth every 6 (six) hours as needed for nausea or vomiting       No current facility-administered medications for this visit  Objective:    Height 4' 10" (1 473 m), weight 38 6 kg (85 lb)  Body mass index is 17 77 kg/m²  Body surface area is 1 27 meters squared  Physical Exam   Constitutional: She is oriented to person, place, and time  Thin and cachectic white female   HENT:   Head: Normocephalic and atraumatic  Eyes: Pupils are equal, round, and reactive to light  EOM are normal    Neck: Normal range of motion  Neck supple  Cardiovascular: Normal rate, regular rhythm and normal heart sounds  Pulmonary/Chest: Effort normal and breath sounds normal  No respiratory distress  Abdominal: Soft  Bowel sounds are normal  She exhibits no distension and no ascites  There is no tenderness  There is no rigidity, no rebound and no guarding  Genitourinary:   Genitourinary Comments: External vulva with erythema consistent with possible candidiasis vagina is obliterated at the introitus and markedly tender  No visible dark discharge or vaginal bleeding is noted  Rectal exam was deferred   Musculoskeletal: Normal range of motion  Lymphadenopathy:     She has no cervical adenopathy  She has no axillary adenopathy  Right: No inguinal and no supraclavicular adenopathy present  Left: No inguinal and no supraclavicular adenopathy present  Neurological: She is alert and oriented to person, place, and time  Skin: Skin is warm and dry  Psychiatric: She has a normal mood and affect   Her behavior is normal        No results found for:   Lab Results   Component Value Date    K 4 3 12/20/2018     12/20/2018    CO2 23 12/20/2018    BUN 18 12/20/2018    CREATININE 1 11 12/20/2018    CALCIUM 8 2 (L) 12/20/2018    AST 14 12/20/2018    ALT 13 12/20/2018    ALKPHOS 81 12/20/2018    EGFR 45 12/20/2018     Lab Results   Component Value Date    WBC 5 36 12/20/2018    HGB 8 9 (L) 12/20/2018    HCT 27 5 (L) 12/20/2018    MCV 93 12/20/2018     12/20/2018     Lab Results   Component Value Date    NEUTROABS 4 36 12/20/2018

## 2019-01-09 NOTE — ASSESSMENT & PLAN NOTE
The patient continues to have worsening fatigue related to her chemotherapy  She is able to perform functions of daily living  We are in the process of having elderly agency come in for the home for evaluations as both she and her  are experiencing fatigue and memory issues

## 2019-01-09 NOTE — ASSESSMENT & PLAN NOTE
Patient has worsening pelvic pain  She has been intolerant of narcotics and nonsteroidal pain medications in the past   She is using 1/2 an Advil tablet on a p r n  basis  She notes continued dark brown discharge  She does not recall that the discharge is getting worse or better but is having memory issues  I am worried that this may be consistent with progression of disease  A CT scan will be ordered    She will maintain the nonsteroidal use as needed

## 2019-01-09 NOTE — ASSESSMENT & PLAN NOTE
Patient has tolerated her Taxol without significant difficulty  She is feeling somewhat fatigued  He she notes worsening pelvic pain that is making it difficult for her to sit and lay down  This is a new finding  She continues to note some vaginal bleeding  This worries me for possible progression of disease however exam is unreliable  We have ordered a CT scan to rule out progression of disease  If this is normal we will continue with Taxol as planned

## 2019-01-10 ENCOUNTER — HOSPITAL ENCOUNTER (OUTPATIENT)
Dept: INFUSION CENTER | Facility: CLINIC | Age: 84
Discharge: HOME | End: 2019-01-10
Payer: MEDICARE

## 2019-01-10 VITALS
HEART RATE: 86 BPM | HEIGHT: 58 IN | WEIGHT: 87.3 LBS | TEMPERATURE: 98.1 F | RESPIRATION RATE: 18 BRPM | DIASTOLIC BLOOD PRESSURE: 62 MMHG | SYSTOLIC BLOOD PRESSURE: 127 MMHG | BODY MASS INDEX: 18.33 KG/M2

## 2019-01-10 PROCEDURE — 96413 CHEMO IV INFUSION 1 HR: CPT

## 2019-01-10 PROCEDURE — 96368 THER/DIAG CONCURRENT INF: CPT

## 2019-01-10 PROCEDURE — 96367 TX/PROPH/DG ADDL SEQ IV INF: CPT

## 2019-01-10 PROCEDURE — 96415 CHEMO IV INFUSION ADDL HR: CPT

## 2019-01-10 RX ORDER — SODIUM CHLORIDE 9 MG/ML
20 INJECTION, SOLUTION INTRAVENOUS CONTINUOUS
Status: DISCONTINUED | OUTPATIENT
Start: 2019-01-11 | End: 2019-01-10

## 2019-01-10 RX ORDER — SODIUM CHLORIDE 9 MG/ML
20 INJECTION, SOLUTION INTRAVENOUS CONTINUOUS
Status: DISCONTINUED | OUTPATIENT
Start: 2019-01-10 | End: 2019-01-13 | Stop reason: HOSPADM

## 2019-01-10 RX ORDER — MAGNESIUM SULFATE HEPTAHYDRATE 40 MG/ML
2 INJECTION, SOLUTION INTRAVENOUS ONCE
Status: COMPLETED | OUTPATIENT
Start: 2019-01-10 | End: 2019-01-10

## 2019-01-10 RX ADMIN — DEXAMETHASONE SODIUM PHOSPHATE 20 MG: 10 INJECTION, SOLUTION INTRAMUSCULAR; INTRAVENOUS at 10:34

## 2019-01-10 RX ADMIN — DIPHENHYDRAMINE HYDROCHLORIDE 25 MG: 50 INJECTION, SOLUTION INTRAMUSCULAR; INTRAVENOUS at 10:53

## 2019-01-10 RX ADMIN — PACLITAXEL 171 MG: 6 INJECTION, SOLUTION INTRAVENOUS at 11:51

## 2019-01-10 RX ADMIN — MAGNESIUM SULFATE HEPTAHYDRATE 2 G: 40 INJECTION, SOLUTION INTRAVENOUS at 11:47

## 2019-01-10 RX ADMIN — SODIUM CHLORIDE 20 ML/HR: 0.9 INJECTION, SOLUTION INTRAVENOUS at 10:19

## 2019-01-10 RX ADMIN — FAMOTIDINE 20 MG: 10 INJECTION, SOLUTION INTRAVENOUS at 11:14

## 2019-01-10 NOTE — PROGRESS NOTES
Pt tolerated chemotherapy infusion without complications  Pt discharged in stable condition and is aware of her next appointment  AVS provided

## 2019-01-10 NOTE — PROGRESS NOTES
Dr Fabien Stallworth note unclear if we are continuing with Taxol treatments prior to her next CT scan  Per Rossi Aaron PA-C who confirmed verbally with Dr Mora Tubbs, pt is to continue with taxol treatment today prior to pt having CT scan performed  Per Tommie Mcdermott, pt is supposed to have the scan performed at a facility close to their home, and Dr Fabien Stallworth office is working to schedule that for the pt

## 2019-01-10 NOTE — PLAN OF CARE
Problem: Potential for Falls  Goal: Patient will remain free of falls  INTERVENTIONS:  - Assess patient frequently for physical needs  -  Identify cognitive and physical deficits and behaviors that affect risk of falls    -  [place] fall precautions as indicated by assessment   - Educate patient/family on patient safety including physical limitations  - Instruct patient to call for assistance with activity based on assessment  - Modify environment to reduce risk of injury  - Consider OT/PT consult to assist with strengthening/mobility   Outcome: Progressing

## 2019-01-10 NOTE — SOCIAL WORK
Met with pt, her  and friend Cee Lucia today in the infusion center  Pt reports that she is doing well  She confirms that [employer] did come out to the house and assess them for services, however due to her income/assets she does not qualify for free services  She stated "He served, I worked, we did without so that we had money in the bank  No one is taking that from me  If we have to pay - we're not doing it "  She speaks of someone she knows who can coordinate volunteer services for her at home, however she can't give me any further information on that  MSW did s/w pt and her  about [facility] placement   is agreeable, but pt will not consider it, saying "I want to stay in my home  If we can manage at home, why would we leave?"  I suggested that it's not just about managing at home, but not having to worry about cleaning, cooking, running errands, plowing snow, etc   Pt asked "well what do you think we should do?"  I answered back that she did work hard her life to have money in savings, her  served our country and worked hard as well for the money that they have, and if it were me I would want to make sure I was well taken care of in my last years  She agreed to at least consider it, but friend Cee Lucia says that she's been discussing this with them as well and pt has been adamant that she won't leave her home  Hermelinda's  uses the [place] for healthcare, as does pt's , and they try to make their appointments at the same time so they can ride together  They plan on talking to a [facility]  about any assistance pt and her  are eligible for  Pt's son lives in [place], it is unclear if he realizes how his parents have declined and they do not have contact information to give me for him  Pt consents to having Cee Lucia listed as an emergency contact for her, and Cee Lucia is agreeable as well  Otherwise, pt has no emergency contact beyond her     Hermelinda's and her  [employer] numbers were added to the chart by this writer  Kevin Oconnor is willing to help pt out at home as long as they are willing to accept it  MSETHEL suggested that Kevin Oconnor touch base with pt's son so that he is familiar and in agreement with this arrangement, she agreed that she would do that in the next week  No further needs or concerns today, MSW will remain available to pt as needed  MSW provided my contact information to Kevin Oconnor today as well

## 2019-01-12 ENCOUNTER — APPOINTMENT (EMERGENCY)
Dept: CT IMAGING | Facility: HOSPITAL | Age: 84
End: 2019-01-12
Payer: MEDICARE

## 2019-01-12 ENCOUNTER — HOSPITAL ENCOUNTER (EMERGENCY)
Facility: HOSPITAL | Age: 84
Discharge: HOME | End: 2019-01-12
Attending: EMERGENCY MEDICINE
Payer: MEDICARE

## 2019-01-12 VITALS
OXYGEN SATURATION: 100 % | TEMPERATURE: 98 F | HEIGHT: 60 IN | BODY MASS INDEX: 17.92 KG/M2 | RESPIRATION RATE: 16 BRPM | WEIGHT: 91.27 LBS | DIASTOLIC BLOOD PRESSURE: 67 MMHG | HEART RATE: 87 BPM | SYSTOLIC BLOOD PRESSURE: 146 MMHG

## 2019-01-12 DIAGNOSIS — C52 VAGINAL CANCER (HCC): ICD-10-CM

## 2019-01-12 DIAGNOSIS — R21 PERINEAL RASH IN FEMALE: ICD-10-CM

## 2019-01-12 DIAGNOSIS — B37.49 GENITAL CANDIDIASIS: ICD-10-CM

## 2019-01-12 DIAGNOSIS — L89.90 PRESSURE ULCER: ICD-10-CM

## 2019-01-12 DIAGNOSIS — R10.2 PELVIC PAIN IN FEMALE: Primary | ICD-10-CM

## 2019-01-12 LAB
ANION GAP SERPL CALCULATED.3IONS-SCNC: 11 MMOL/L (ref 4–13)
BASOPHILS # BLD AUTO: 0.01 THOUSANDS/ΜL (ref 0–0.1)
BASOPHILS NFR BLD AUTO: 0 % (ref 0–1)
BUN SERPL-MCNC: 28 MG/DL (ref 5–25)
CALCIUM SERPL-MCNC: 8.1 MG/DL (ref 8.3–10.1)
CHLORIDE SERPL-SCNC: 108 MMOL/L (ref 100–108)
CO2 SERPL-SCNC: 21 MMOL/L (ref 21–32)
CREAT SERPL-MCNC: 1.14 MG/DL (ref 0.6–1.3)
EOSINOPHIL # BLD AUTO: 0 THOUSAND/ΜL (ref 0–0.61)
EOSINOPHIL NFR BLD AUTO: 0 % (ref 0–6)
ERYTHROCYTE [DISTWIDTH] IN BLOOD BY AUTOMATED COUNT: 13.5 % (ref 11.6–15.1)
GFR SERPL CREATININE-BSD FRML MDRD: 44 ML/MIN/1.73SQ M
GLUCOSE SERPL-MCNC: 78 MG/DL (ref 65–140)
HCT VFR BLD AUTO: 25.3 % (ref 34.8–46.1)
HGB BLD-MCNC: 8.1 G/DL (ref 11.5–15.4)
IMM GRANULOCYTES # BLD AUTO: 0.06 THOUSAND/UL (ref 0–0.2)
IMM GRANULOCYTES NFR BLD AUTO: 1 % (ref 0–2)
LYMPHOCYTES # BLD AUTO: 1.38 THOUSANDS/ΜL (ref 0.6–4.47)
LYMPHOCYTES NFR BLD AUTO: 14 % (ref 14–44)
MCH RBC QN AUTO: 28.5 PG (ref 26.8–34.3)
MCHC RBC AUTO-ENTMCNC: 32 G/DL (ref 31.4–37.4)
MCV RBC AUTO: 89 FL (ref 82–98)
MONOCYTES # BLD AUTO: 0.2 THOUSAND/ΜL (ref 0.17–1.22)
MONOCYTES NFR BLD AUTO: 2 % (ref 4–12)
NEUTROPHILS # BLD AUTO: 8.38 THOUSANDS/ΜL (ref 1.85–7.62)
NEUTS SEG NFR BLD AUTO: 83 % (ref 43–75)
NRBC BLD AUTO-RTO: 0 /100 WBCS
PLATELET # BLD AUTO: 226 THOUSANDS/UL (ref 149–390)
PMV BLD AUTO: 9.6 FL (ref 8.9–12.7)
POTASSIUM SERPL-SCNC: 4.5 MMOL/L (ref 3.5–5.3)
RBC # BLD AUTO: 2.84 MILLION/UL (ref 3.81–5.12)
SODIUM SERPL-SCNC: 140 MMOL/L (ref 136–145)
WBC # BLD AUTO: 10.03 THOUSAND/UL (ref 4.31–10.16)

## 2019-01-12 PROCEDURE — 96374 THER/PROPH/DIAG INJ IV PUSH: CPT

## 2019-01-12 PROCEDURE — 99284 EMERGENCY DEPT VISIT MOD MDM: CPT

## 2019-01-12 PROCEDURE — 74177 CT ABD & PELVIS W/CONTRAST: CPT

## 2019-01-12 PROCEDURE — 85025 COMPLETE CBC W/AUTO DIFF WBC: CPT | Performed by: EMERGENCY MEDICINE

## 2019-01-12 PROCEDURE — 80048 BASIC METABOLIC PNL TOTAL CA: CPT | Performed by: EMERGENCY MEDICINE

## 2019-01-12 PROCEDURE — 36415 COLL VENOUS BLD VENIPUNCTURE: CPT | Performed by: EMERGENCY MEDICINE

## 2019-01-12 RX ORDER — FENTANYL CITRATE 50 UG/ML
25 INJECTION, SOLUTION INTRAMUSCULAR; INTRAVENOUS ONCE
Status: COMPLETED | OUTPATIENT
Start: 2019-01-12 | End: 2019-01-12

## 2019-01-12 RX ORDER — FLUCONAZOLE 150 MG/1
150 TABLET ORAL DAILY
Status: DISCONTINUED | OUTPATIENT
Start: 2019-01-13 | End: 2019-01-12

## 2019-01-12 RX ORDER — NYSTATIN 100000 [USP'U]/G
POWDER TOPICAL 2 TIMES DAILY
Qty: 15 G | Refills: 0 | Status: SHIPPED | OUTPATIENT
Start: 2019-01-12

## 2019-01-12 RX ORDER — FLUCONAZOLE 150 MG/1
150 TABLET ORAL ONCE
Status: COMPLETED | OUTPATIENT
Start: 2019-01-12 | End: 2019-01-12

## 2019-01-12 RX ADMIN — IODIXANOL 75 ML: 320 INJECTION, SOLUTION INTRAVASCULAR at 16:15

## 2019-01-12 RX ADMIN — HEPARIN 300 UNITS: 100 SYRINGE at 17:18

## 2019-01-12 RX ADMIN — FLUCONAZOLE 150 MG: 150 TABLET ORAL at 17:22

## 2019-01-12 RX ADMIN — FENTANYL CITRATE 25 MCG: 50 INJECTION, SOLUTION INTRAMUSCULAR; INTRAVENOUS at 13:50

## 2019-01-12 NOTE — ED PROVIDER NOTES
History  Chief Complaint   Patient presents with    Vaginal Pain     vaginal pain x multiple weeks  pt was seen by her doctor and told she needs a CT   Rectal Pain     HPI    Prior to Admission Medications   Prescriptions Last Dose Informant Patient Reported? Taking?    ASPIRIN 81 PO  Self Yes No   Sig: Take by mouth   CA & PHOS-VIT D 105- MG-MG-UNIT TABS  Self Yes No   Sig: Take by mouth   Cholecalciferol (VITAMIN D3) 54533 units TABS  Self Yes No   Sig: Take by mouth   Dextrose, Diabetic Use, (GLUTOSE 15 PO)  Self Yes No   Sig: Take by mouth   LORazepam (ATIVAN) 1 mg tablet  Self No No   Sig: Take 1 tablet (1 mg total) by mouth every 6 (six) hours as needed (nausea or anxiety)   cyanocobalamin 1000 MCG tablet  Self Yes No   Sig: Take 100 mcg by mouth daily   dexamethasone (DECADRON) 4 mg tablet   No No   Sig: For 5 tablets by mouth the morning  and the evening prior to chemotherapy   diphenoxylate-atropine (LOMOTIL) 2 5-0 025 mg per tablet  Self No No   Sig: Take 1 tablet by mouth 4 (four) times a day as needed for diarrhea   ezetimibe (ZETIA) 10 mg tablet  Self Yes No   Sig: Take 10 mg by mouth daily   ibuprofen (MOTRIN) 600 mg tablet  Self Yes No   Sig: Take by mouth every 6 (six) hours as needed for mild pain   lactase (LACTAID) 3,000 units tablet  Self Yes No   Sig: Take 3,000 Units by mouth 3 (three) times a day with meals   nystatin (MYCOSTATIN) powder   No No   Sig: Apply topically 2 (two) times a day As needed for vaginal irritation   ondansetron (ZOFRAN) 8 mg tablet  Self Yes No   Sig: Take by mouth every 8 (eight) hours as needed for nausea or vomiting   prochlorperazine (COMPAZINE) 10 mg tablet  Self Yes No   Sig: Take 10 mg by mouth every 6 (six) hours as needed for nausea or vomiting      Facility-Administered Medications: None       Past Medical History:   Diagnosis Date    Abdominal pain     Avascular necrosis of bone of hip, right (HCC)     Cerebrovascular disease     Acute III-defined  Cholecystitis     Chronic kidney disease     Right renal atrophy     Endometrial cancer ([facility] [address] )     Hereditary and idiopathic peripheral neuropathy     History of radiation therapy     Vaginal brachytherapy     Hyperlipidemia     Hypertension     Hypoglycemia     Malignant neoplasm of endometrium (HCC)     Radiation colitis     Shingles     Small bowel obstruction (HCC)     Syncope     per pt blacked out due to low blood sugar     TIA (transient ischemic attack)        Past Surgical History:   Procedure Laterality Date    EXPLORATORY LAPAROTOMY W/ BOWEL RESECTION      extensive DAVON, segmental bowel resection     HYSTERECTOMY      W/ B/L salpingoophorectomy     KNEE SURGERY      LYMPH NODE DISSECTION      PARTIAL HIP ARTHROPLASTY      PORTACATH PLACEMENT      TOTAL ABDOMINAL HYSTERECTOMY W/ BILATERAL SALPINGOOPHORECTOMY      PPLND grade 1 endometrial cancer stage 1A    VENTRAL HERNIA REPAIR         Family History   Problem Relation Age of Onset    Colon cancer Mother     Lung cancer Sister     Prostate cancer Brother     Endometrial cancer Cousin      I have reviewed and agree with the history as documented  Social History   Substance Use Topics    Smoking status: Former Smoker     Years: 2 50     Quit date: 1981    Smokeless tobacco: Never Used    Alcohol use No        Review of Systems    Physical Exam  Physical Exam   Constitutional: She is oriented to person, place, and time  She appears cachectic  She appears ill (chronically)  No distress  frail   HENT:   Head: Normocephalic and atraumatic  Mouth/Throat: Oropharynx is clear and moist    Eyes: Pupils are equal, round, and reactive to light  Conjunctivae are normal    Neck: Normal range of motion  No tracheal deviation present  Cardiovascular: Normal rate, regular rhythm, normal heart sounds and intact distal pulses  Pulmonary/Chest: Effort normal and breath sounds normal  No respiratory distress  Abdominal: Soft  Bowel sounds are normal  She exhibits no distension  There is no tenderness  Genitourinary: Rectal exam shows external hemorrhoid (Several, small, nontender, no blood)  There is rash and tenderness on the right labia  There is no lesion on the right labia  There is rash and tenderness on the left labia  There is no lesion on the left labia  Neurological: She is alert and oriented to person, place, and time  She has normal strength  GCS eye subscore is 4  GCS verbal subscore is 5  GCS motor subscore is 6  Skin: Skin is warm and dry  Psychiatric: She has a normal mood and affect  Her behavior is normal    Nursing note and vitals reviewed        Vital Signs  ED Triage Vitals   Temperature Pulse Respirations Blood Pressure SpO2   01/12/19 1212 01/12/19 1212 01/12/19 1212 01/12/19 1212 01/12/19 1212   98 °F (36 7 °C) 82 16 (!) 171/72 100 %      Temp Source Heart Rate Source Patient Position - Orthostatic VS BP Location FiO2 (%)   01/12/19 1212 01/12/19 1212 01/12/19 1212 01/12/19 1419 --   Oral Monitor Lying Left arm       Pain Score       01/12/19 1212       Worst Possible Pain           Vitals:    01/12/19 1419 01/12/19 1600 01/12/19 1630 01/12/19 1700   BP: 152/71 148/67 140/64 146/67   Pulse: 76 83 83 87   Patient Position - Orthostatic VS: Lying Lying Lying Lying       Visual Acuity      ED Medications  Medications   fentanyl citrate (PF) 100 MCG/2ML 25 mcg (25 mcg Intravenous Given 1/12/19 1350)   iodixanol (VISIPAQUE) 320 MG/ML injection 75 mL (75 mL Intravenous Given 1/12/19 1615)   heparin lock flush 100 units/mL injection 300 Units (300 Units Intracatheter Given 1/12/19 1718)   fluconazole (DIFLUCAN) tablet 150 mg (150 mg Oral Given 1/12/19 1722)       Diagnostic Studies  Results Reviewed     Procedure Component Value Units Date/Time    Basic metabolic panel [[id number]]  (Abnormal) Collected:  01/12/19 1349    Lab Status:  Final result Specimen:  Blood from Arm, Right Updated:  01/12/19 2990 Sodium 140 mmol/L      Potassium 4 5 mmol/L      Chloride 108 mmol/L      CO2 21 mmol/L      ANION GAP 11 mmol/L      BUN 28 (H) mg/dL      Creatinine 1 14 mg/dL      Glucose 78 mg/dL      Calcium 8 1 (L) mg/dL      eGFR 44 ml/min/1 73sq m     Narrative:         National Kidney Disease Education Program recommendations are as follows:  GFR calculation is accurate only with a steady state creatinine  Chronic Kidney disease less than 60 ml/min/1 73 sq  meters  Kidney failure less than 15 ml/min/1 73 sq  meters  CBC and differential [[id number]]  (Abnormal) Collected:  01/12/19 1349    Lab Status:  Final result Specimen:  Blood from Arm, Right Updated:  01/12/19 1354     WBC 10 03 Thousand/uL      RBC 2 84 (L) Million/uL      Hemoglobin 8 1 (L) g/dL      Hematocrit 25 3 (L) %      MCV 89 fL      MCH 28 5 pg      MCHC 32 0 g/dL      RDW 13 5 %      MPV 9 6 fL      Platelets 378 Thousands/uL      nRBC 0 /100 WBCs      Neutrophils Relative 83 (H) %      Immat GRANS % 1 %      Lymphocytes Relative 14 %      Monocytes Relative 2 (L) %      Eosinophils Relative 0 %      Basophils Relative 0 %      Neutrophils Absolute 8 38 (H) Thousands/µL      Immature Grans Absolute 0 06 Thousand/uL      Lymphocytes Absolute 1 38 Thousands/µL      Monocytes Absolute 0 20 Thousand/µL      Eosinophils Absolute 0 00 Thousand/µL      Basophils Absolute 0 01 Thousands/µL                  CT abdomen pelvis with contrast   Final Result by Joe Carroll MD (01/12 9989)      Fluid-filled colonic loops and distal small bowel with suggesting an enteritis/diarrhea illness  The vagina is filled with fluid and demonstrates an enhancing wall perhaps in keeping with provided history of vaginal cancer  Infection could appear similar  Pelvic exam correlation recommended, if feasible  Gallstones without evidence of cholecystitis        Small cystic lesion in the pancreatic head measuring 7 mm with connection to the pancreatic duct in keeping with a side branch IPMN  Follow-up MRI/MRCP could be obtained  Considerations related to the patient's age and/or comorbidities may be used to    alter these recommendations  Workstation performed: [id number]                    Procedures  Procedures       Phone Contacts  ED Phone Contact    ED Course                               MDM  Number of Diagnoses or Management Options  Pelvic pain in female: new and requires workup  Perineal rash in female: new and requires workup  Pressure ulcer: new and does not require workup  Vaginal cancer [facility]):   Diagnosis management comments: This is an 80-year-old female who is being treated for endometrial cancer with recurrence in her vagina is here today with pelvic and vaginal pain  She has had pain in this area for weeks and says that has been gradually getting worse  She had been seen by her gynecologic oncologist earlier this week and had a CT scan ordered  She says she is having significant troubles sitting because of the severity of her pain  She has been having occasional dark vaginal bleeding "for a while" which is unchanged  She endorses slightly more leakage of urine than normal but denies dysuria or other urinary symptoms  She states more recently she has been having pain around her rectum but denies any pain with bowel movements, changes in her bowels, rectal bleeding  She was prescribed a cream by her doctor when she was seen earlier this week that she says she has not yet gotten  She says he did not give her a prescription for it, though he told her he would  She denies any fevers, nausea, vomiting, other complaints  She has been taking ibuprofen with minimal improvement of her pain  She is here today because of the persistent pain that she is having  According to the note from 01/09, the patient presented for scheduled follow-up, and was noticing her chronic fatigue as well as worsening pelvic pain and and vaginal bleeding    A CT scan was ordered to be done as an outpatient  There was concern for candidiasis of the vaginal region  ROS: Otherwise negative, unless stated as above  She is chronically ill-appearing but in no acute distress  She is cachectic and very frail  There is no abdominal tenderness  She has very erythematous rash the external genital region and extending over the mons with tenderness to this area  She does have a pressure ulcer to her lower lumbar area due to lying on her back for the past several days, and stating she is not moving around much  The remainder of her exam is unremarkable  Her pain could be due to her rash, worsening of her cancer, and spread of mass to the area  We will get a CT scan to evaluate  The CT scan shows findings consistent with questionable infection verses worsening of her cancer  There other findings are not related to her current pain, but do require follow-up by her doctor  Her anemia is at baseline  The remainder of her labs are unremarkable  She has no leukocytosis, fevers, other symptoms to suggest infection  I did tell her that she should have a pelvic exam performed to evaluate for signs of infection but she adamantly refuses this and states she wants this done by her doctor  I spoke with Dr Glinda Oppenheim, who is on-call for gynecologic oncology  He would recommend treatment with oral fluconazole for one dose, as well as the topical nystatin  He states that if the patient tolerates it he would treat her with oral narcotic medications  I did ask about topical lidocaine, any states this may burn the area, but would not harm things, so would be okay to try  As he will insure that she has follow-up with her doctor this week to discuss the CT scan result in additional management  I offered the patient narcotic pain medications at home which she declines this, and states she will continue taking ibuprofen    I discussed with her indications for return, importance of not continuing supplying her back so that the pressure ulcer will heal, and indications for return, and she expresses understanding with this plan  Amount and/or Complexity of Data Reviewed  Clinical lab tests: ordered and reviewed  Tests in the radiology section of CPT®: ordered and reviewed  Decide to obtain previous medical records or to obtain history from someone other than the patient: yes  Review and summarize past medical records: yes  Discuss the patient with other providers: yes  Independent visualization of images, tracings, or specimens: yes      CritCare Time    Disposition  Final diagnoses:   Pelvic pain in female   Vaginal cancer ([facility] [address] )   Perineal rash in female   Pressure ulcer - stage 2, lower lumbar     Time reflects when diagnosis was documented in both MDM as applicable and the Disposition within this note     Time User Action Codes Description Comment    1/12/2019  5:25 PM Princess Yun Add [R10 2] Pelvic pain in female     1/12/2019  5:25 PM Princess Yun Add [C52] Vaginal cancer ([facility] [address] )     1/12/2019  5:25 PM Princess Yun Add [R21] Perineal rash in female     1/12/2019  5:26 PM Princess Yun Add [B37 49,  B37 89] Genital candidiasis     1/12/2019  5:55 PM Princess Yun Add [L89 90] Pressure ulcer     1/12/2019  5:55 PM Princess Yun Modify [L89 90] Pressure ulcer stage 2, lower lumbar      ED Disposition     ED Disposition Condition Comment    Discharge  Rick Machuca discharge to home/self care      Condition at discharge: Good        Follow-up Information     Follow up With Specialties Details Why Corina Jordan MD Gynecologic Oncology Schedule an appointment as soon as possible for a visit this week, to follow up on the CT scan and your symptoms [id number] [address] [address]  [phone]            Discharge Medication List as of 1/12/2019  5:32 PM      START taking these medications    Details   lidocaine (XYLOCAINE) 2 % topical gel Apply 1 application topically as needed (vaginal pain), Starting Sat 1/12/2019, Print         CONTINUE these medications which have CHANGED    Details   nystatin (MYCOSTATIN) powder Apply topically 2 (two) times a day As needed for vaginal irritation, Starting Sat 1/12/2019, Print         CONTINUE these medications which have NOT CHANGED    Details   ASPIRIN 81 PO Take by mouth, Historical Med      CA & PHOS-VIT D 105- MG-MG-UNIT TABS Take by mouth, Historical Med      Cholecalciferol (VITAMIN D3) 39875 units TABS Take by mouth, Historical Med      cyanocobalamin 1000 MCG tablet Take 100 mcg by mouth daily, Historical Med      dexamethasone (DECADRON) 4 mg tablet For 5 tablets by mouth the morning  and the evening prior to chemotherapy, Normal      Dextrose, Diabetic Use, (GLUTOSE 15 PO) Take by mouth, Historical Med      diphenoxylate-atropine (LOMOTIL) 2 5-0 025 mg per tablet Take 1 tablet by mouth 4 (four) times a day as needed for diarrhea, Starting Wed 11/7/2018, Normal      ezetimibe (ZETIA) 10 mg tablet Take 10 mg by mouth daily, Historical Med      ibuprofen (MOTRIN) 600 mg tablet Take by mouth every 6 (six) hours as needed for mild pain, Historical Med      lactase (LACTAID) 3,000 units tablet Take 3,000 Units by mouth 3 (three) times a day with meals, Historical Med      LORazepam (ATIVAN) 1 mg tablet Take 1 tablet (1 mg total) by mouth every 6 (six) hours as needed (nausea or anxiety), Starting Tue 12/4/2018, Normal      ondansetron (ZOFRAN) 8 mg tablet Take by mouth every 8 (eight) hours as needed for nausea or vomiting, Historical Med      prochlorperazine (COMPAZINE) 10 mg tablet Take 10 mg by mouth every 6 (six) hours as needed for nausea or vomiting, Historical Med           No discharge procedures on file      ED Provider  Electronically Signed by           Kavya Rolle MD  01/12/19 0741

## 2019-01-12 NOTE — DISCHARGE INSTRUCTIONS
Apply the topical medications as prescribed  Do not lay flat on your back, so that the wound has a chance to heal   Sit on a donut pillow, designed for hemorrhoids, to help with your pain while sitting  Follow up with Dr Jimmie Thao for further evaluation of your symptoms  Vulvovaginal Candidiasis   WHAT YOU NEED TO KNOW:   Vulvovaginal candidiasis, or yeast infection, is a common vaginal infection  Vulvovaginal candidiasis is caused by a fungus, or yeast-like germ  Fungi are normally found in your vagina  When there are too many fungi, it can cause an infection  DISCHARGE INSTRUCTIONS:   Return to the emergency department if:   · You have fever and chills  · You are bleeding from your vagina and it is not your monthly period  · You develop abdominal or pelvic pain  Contact your healthcare provider if:   · Your signs and symptoms get worse, even after treatment  · You have questions or concerns about your condition or care  Medicines:   · Medicines  help treat the fungal infection and decrease inflammation  The medicine may be a pill, topical cream, or vaginal suppository  · Take your medicine as directed  Contact your healthcare provider if you think your medicine is not helping or if you have side effects  Tell him of her if you are allergic to any medicine  Keep a list of the medicines, vitamins, and herbs you take  Include the amounts, and when and why you take them  Bring the list or the pill bottles to follow-up visits  Carry your medicine list with you in case of an emergency  Manage your symptoms:   · Wear cotton underwear  · Keep the vaginal area clean and dry  · Do not have sex until your symptoms are gone  · Do not douche  · Do not use feminine hygiene sprays, powders, or bubble bath  Prevent another infection:   · Take showers instead of baths  · Eat yogurt  · Limit the amount of alcohol you drink'    · Control your blood sugar if you are diabetic      · Limit your time in hot tubs  Follow up with your healthcare provider as directed:  Write down your questions so you remember to ask them during your visits  © 2017 [address] [employer] Information is for End User's use only and may not be sold, redistributed or otherwise used for commercial purposes  All illustrations and images included in CareNotes® are the copyrighted property of [employer]  or [employer]  The above information is an  only  It is not intended as medical advice for individual conditions or treatments  Talk to your doctor, nurse or pharmacist before following any medical regimen to see if it is safe and effective for you

## 2019-01-14 ENCOUNTER — TELEPHONE (OUTPATIENT)
Dept: GYNECOLOGIC ONCOLOGY | Facility: CLINIC | Age: 84
End: 2019-01-14

## 2019-01-14 ENCOUNTER — TELEPHONE (OUTPATIENT)
Dept: PALLIATIVE MEDICINE | Facility: CLINIC | Age: 84
End: 2019-01-14

## 2019-01-14 NOTE — TELEPHONE ENCOUNTER
I discussed with Lucina Enriqueta, a close friend and neighbor, [place]'s present condition  SHe reports a rapid decline and in need of palliative care/ hospice services  I agreed with her recommendation in light if the CT scan showing preogression of disease despite recent chemo trial  The patient and  have been reticent to undergo palliative care or hospice in the past  I will call [facility]  and let him know that the chemo has failed and put in a consult for home hospice

## 2019-01-14 NOTE — TELEPHONE ENCOUNTER
Made contact with Pat in the home hospice intake department  Demographic information provided  Office will reach out to patient to set up home visits

## 2019-01-14 NOTE — TELEPHONE ENCOUNTER
Spoke with pt's friend  She was hoping for a home visit, but upon explaining Palliative Care services, and explaining there isn't a nurse to see pt at home, she began to express her confusion between Palliative Care and Hospice  She was under the impression that Palliative care and Hospice were one and the same  It isn't too clear whether they'd like to pursue Palliative Care or Hospice  I attempted to explain the difference briefly over the phone  I would really appreciate it if you could follow up and have a more in depth conversation

## 2019-01-14 NOTE — TELEPHONE ENCOUNTER
Phone call made to [facility] and left message regarding follow up on phone conversations to attempt scheduling  Awaiting call back from [facility]

## 2019-01-14 NOTE — TELEPHONE ENCOUNTER
Can the clerical staff reach out to this patient and see if we can schedule visit with Dr Ovidio Mann in [facility]? Thank you   Ginny Briscoe

## 2019-01-29 ENCOUNTER — TELEPHONE (OUTPATIENT)
Dept: INTERNAL MEDICINE CLINIC | Facility: CLINIC | Age: 84
End: 2019-01-29

## 2019-01-29 NOTE — TELEPHONE ENCOUNTER
Huyen Morales wanted to know if Dr Nowak would sign the death certificate he is listed as her PCP from [facility] please advise if he will sign

## 2021-10-09 NOTE — TELEPHONE ENCOUNTER
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 Outcome: Progressing Towards Goal  Note: Fall Risk Interventions:  Mobility Interventions: Bed/chair exit alarm    Mentation Interventions: Door open when patient unattended    Medication Interventions: Bed/chair exit alarm    Elimination Interventions: Toileting schedule/hourly rounds    History of Falls Interventions: Door open when patient unattended         Problem: Patient Education: Go to Patient Education Activity  Goal: Patient/Family Education  Outcome: Progressing Towards Goal     Problem: Pressure Injury - Risk of  Goal: *Prevention of pressure injury  Description: Document Dejuan Scale and appropriate interventions in the flowsheet.   Outcome: Progressing Towards Goal  Note: Pressure Injury Interventions:  Sensory Interventions: Assess changes in LOC, Check visual cues for pain, Float heels, Keep linens dry and wrinkle-free, Maintain/enhance activity level    Moisture Interventions: Absorbent underpads, Apply protective barrier, creams and emollients, Check for incontinence Q2 hours and as needed, Maintain skin hydration (lotion/cream), Moisture barrier    Activity Interventions: PT/OT evaluation, Increase time out of bed    Mobility Interventions: Float heels, HOB 30 degrees or less    Nutrition Interventions: Document food/fluid/supplement intake, Offer support with meals,snacks and hydration    Friction and Shear Interventions: Apply protective barrier, creams and emollients                Problem: Patient Education: Go to Patient Education Activity  Goal: Patient/Family Education  Outcome: Progressing Towards Goal     Problem: Diabetes Maintenance:Ongoing  Goal: Activity/Safety  Outcome: Progressing Towards Goal  Goal: Treatments/Interventsions/Procedures  Outcome: Progressing Towards Goal  Goal: *Blood Glucose 80 to 180 md/dl  Outcome: Progressing Towards Goal Problem: Diabetes Maintenance:Discharge Outcomes  Goal: *Describes follow-up/return visits to physicians  Outcome: Progressing Towards Goal  Goal: *Blood glucose at patient's target range or approaching  Outcome: Progressing Towards Goal  Goal: *Aware of nutrition guidelines  Outcome: Progressing Towards Goal  Goal: *Verbalizes information about medication  Description: Verbalizes name, dosage, time, side effects, and number of days to  continue medications. Outcome: Progressing Towards Goal  Goal: *Describes goals, rules, symptoms, and treatments  Description: Describes blood glucose goals, monitoring, sick day rules,  hypo/hyperglycemia prevention, symptoms, and treatment  Outcome: Progressing Towards Goal  Goal: *Describes available outpatient diabetes resources and support systems  Outcome: Progressing Towards Goal     Problem: Diabetes Self-Management  Goal: *Disease process and treatment process  Description: Define diabetes and identify own type of diabetes; list 3 options for treating diabetes. Outcome: Progressing Towards Goal  Goal: *Incorporating nutritional management into lifestyle  Description: Describe effect of type, amount and timing of food on blood glucose; list 3 methods for planning meals. Outcome: Progressing Towards Goal  Goal: *Incorporating physical activity into lifestyle  Description: State effect of exercise on blood glucose levels. Outcome: Progressing Towards Goal  Goal: *Developing strategies to promote health/change behavior  Description: Define the ABC's of diabetes; identify appropriate screenings, schedule and personal plan for screenings. Outcome: Progressing Towards Goal  Goal: *Using medications safely  Description: State effect of diabetes medications on diabetes; name diabetes medication taking, action and side effects.   Outcome: Progressing Towards Goal  Goal: *Monitoring blood glucose, interpreting and using results  Description: Identify recommended blood glucose targets  and personal targets. Outcome: Progressing Towards Goal  Goal: *Prevention, detection, treatment of acute complications  Description: List symptoms of hyper- and hypoglycemia; describe how to treat low blood sugar and actions for lowering  high blood glucose level. Outcome: Progressing Towards Goal  Goal: *Prevention, detection and treatment of chronic complications  Description: Define the natural course of diabetes and describe the relationship of blood glucose levels to long term complications of diabetes.   Outcome: Progressing Towards Goal  Goal: *Developing strategies to address psychosocial issues  Description: Describe feelings about living with diabetes; identify support needed and support network  Outcome: Progressing Towards Goal  Goal: *Patient Specific Goal (EDIT GOAL, INSERT TEXT)  Outcome: Progressing Towards Goal     Problem: Patient Education: Go to Patient Education Activity  Goal: Patient/Family Education  Outcome: Progressing Towards Goal     Problem: Patient Education: Go to Patient Education Activity  Goal: Patient/Family Education  Outcome: Progressing Towards Goal